# Patient Record
Sex: MALE
[De-identification: names, ages, dates, MRNs, and addresses within clinical notes are randomized per-mention and may not be internally consistent; named-entity substitution may affect disease eponyms.]

---

## 2022-03-09 ENCOUNTER — APPOINTMENT (OUTPATIENT)
Dept: UROLOGY | Facility: CLINIC | Age: 47
End: 2022-03-09
Payer: COMMERCIAL

## 2022-03-09 DIAGNOSIS — Z78.9 OTHER SPECIFIED HEALTH STATUS: ICD-10-CM

## 2022-03-09 PROBLEM — Z00.00 ENCOUNTER FOR PREVENTIVE HEALTH EXAMINATION: Status: ACTIVE | Noted: 2022-03-09

## 2022-03-09 PROCEDURE — 99204 OFFICE O/P NEW MOD 45 MIN: CPT

## 2022-03-09 RX ORDER — ALLOPURINOL 200 MG/1
TABLET ORAL
Refills: 0 | Status: ACTIVE | COMMUNITY

## 2022-03-09 RX ORDER — CLOPIDOGREL BISULFATE 300 MG/1
TABLET, FILM COATED ORAL
Refills: 0 | Status: ACTIVE | COMMUNITY

## 2022-03-09 RX ORDER — SITAGLIPTIN AND METFORMIN HYDROCHLORIDE 50; 1000 MG/1; MG/1
50-1000 TABLET, FILM COATED ORAL
Refills: 0 | Status: ACTIVE | COMMUNITY

## 2022-03-09 RX ORDER — POTASSIUM CITRATE 15 MEQ/1
TABLET, EXTENDED RELEASE ORAL
Refills: 0 | Status: ACTIVE | COMMUNITY

## 2022-03-09 RX ORDER — LOSARTAN POTASSIUM 100 MG/1
TABLET, FILM COATED ORAL
Refills: 0 | Status: ACTIVE | COMMUNITY

## 2022-03-09 RX ORDER — PANTOPRAZOLE 40 MG/1
TABLET, DELAYED RELEASE ORAL
Refills: 0 | Status: ACTIVE | COMMUNITY

## 2022-03-09 RX ORDER — METOPROLOL TARTRATE 50 MG/1
50 TABLET, FILM COATED ORAL
Refills: 0 | Status: ACTIVE | COMMUNITY

## 2022-03-09 RX ORDER — COLCHICINE 0.6 MG/1
TABLET ORAL
Refills: 0 | Status: ACTIVE | COMMUNITY

## 2022-03-09 RX ORDER — GEMFIBROZIL 600 MG/1
TABLET, FILM COATED ORAL
Refills: 0 | Status: ACTIVE | COMMUNITY

## 2022-03-09 RX ORDER — GLIPIZIDE 5 MG/1
5 TABLET ORAL
Refills: 0 | Status: ACTIVE | COMMUNITY

## 2022-03-09 RX ORDER — NIFEDIPINE 20 MG/1
CAPSULE ORAL
Refills: 0 | Status: ACTIVE | COMMUNITY

## 2022-03-09 RX ORDER — ROSUVASTATIN CALCIUM 5 MG/1
TABLET, FILM COATED ORAL
Refills: 0 | Status: ACTIVE | COMMUNITY

## 2022-03-09 RX ORDER — EZETIMIBE 10 MG/1
10 TABLET ORAL
Refills: 0 | Status: ACTIVE | COMMUNITY

## 2022-03-09 NOTE — REVIEW OF SYSTEMS
[Eyesight Problems] : eyesight problems [Joint Swelling] : joint swelling [Itching] : itching [Negative] : Heme/Lymph

## 2022-03-14 NOTE — HISTORY OF PRESENT ILLNESS
[None] : no symptoms [FreeTextEntry1] : ELISABET PERES is a 46 year M who presents for ? incidental right upper pole stone 6 mm suspected based on renal US report done for CKD. No stone pain, no flank or abdomen pain, no hematuria. No dysuria. H/o gout, in addition to mult underlying disease. \par \par PMH: cad, cholesterol, htn, ckd, h/o gout\par PSH: no\par FH: no sig  FH\par Meds: mult- see list\par SH: former smoker\par Allergies: nkda\par

## 2022-03-14 NOTE — PHYSICAL EXAM
[General Appearance - Well Developed] : well developed [Edema] : no peripheral edema [Abdomen Soft] : soft [Normal Station and Gait] : the gait and station were normal for the patient's age [] : no rash [No Focal Deficits] : no focal deficits [Oriented To Time, Place, And Person] : oriented to person, place, and time [Urinary Bladder Findings] : the bladder was normal on palpation

## 2022-03-14 NOTE — ASSESSMENT
[FreeTextEntry1] : Report from Renal US-  right 6 mm kidney stone. Bilateral simple cysts. Bilateral no hydro or solid masses\par \par Pt is scheduled for a kidney biopsy for kidney function \par \par Ordered CT stone to evaluate stone burden \par \par plan \par 1) CT stone hunt \par 2) RTC to review (or telehealth)

## 2022-03-17 ENCOUNTER — RESULT REVIEW (OUTPATIENT)
Age: 47
End: 2022-03-17

## 2022-03-17 ENCOUNTER — APPOINTMENT (OUTPATIENT)
Dept: CT IMAGING | Facility: IMAGING CENTER | Age: 47
End: 2022-03-17
Payer: COMMERCIAL

## 2022-03-17 ENCOUNTER — OUTPATIENT (OUTPATIENT)
Dept: OUTPATIENT SERVICES | Facility: HOSPITAL | Age: 47
LOS: 1 days | End: 2022-03-17
Payer: COMMERCIAL

## 2022-03-17 DIAGNOSIS — N20.0 CALCULUS OF KIDNEY: ICD-10-CM

## 2022-03-17 PROCEDURE — 74176 CT ABD & PELVIS W/O CONTRAST: CPT | Mod: 26

## 2022-03-17 PROCEDURE — 74176 CT ABD & PELVIS W/O CONTRAST: CPT

## 2022-03-22 ENCOUNTER — APPOINTMENT (OUTPATIENT)
Dept: UROLOGY | Facility: CLINIC | Age: 47
End: 2022-03-22
Payer: COMMERCIAL

## 2022-03-22 DIAGNOSIS — E27.8 OTHER SPECIFIED DISORDERS OF ADRENAL GLAND: ICD-10-CM

## 2022-03-22 PROCEDURE — 99214 OFFICE O/P EST MOD 30 MIN: CPT | Mod: 95

## 2022-03-22 NOTE — HISTORY OF PRESENT ILLNESS
[Other Location: e.g. School (Enter Location, City,State)___] : at [unfilled], at the time of the visit. [Other Location: e.g. Home (Enter Location, City,State)___] : at [unfilled] [Verbal consent obtained from patient] : the patient, [unfilled] [FreeTextEntry1] : The patient-doctor relationship has been established in a face to face fashion via real time video/audio HIPAA compliant communication using telemedicine software. The patient's identity has been confirmed. The patient was previously emailed a copy of the telemedicine consent. They have had a chance to review and has now given verbal consent and has requested care to be assessed and treated via telemedicine. They understand there may be limitations in this process, and that they may need further followup care in the office and/or hospital settings.\par \par Verbal consent given on Mar 22 2022 11:00AM\par \par ELISABET PERES is a 47 year M who presents for f/u of stone disease suggested on renal US.\par \par CT scan: no current stones. Small likely cyst right upper. incidental 1.9 cm left adrenal nodule, low HU, c/w most with incidental adenoma.\par \par 24 hour urine with his nephrologist with 4 liters uop, but UA 1384 mg, oxalate 66, and sodium 276 (I don't see calcium).\par \par Serum labs with creat 1.9, K 3.6, calcium 9.8\par \par 03/22/2022 \par \par The patient denies fevers, chills, nausea and/or vomiting, and no unexplained weight loss.\par \par All pertinent parts of the patient PFSH (past medical, family, and social histories), laboratory, radiological studies and available physician notes were reviewed prior to starting the face-to-face portion of the telemedicine visit. Questionnaire results, where appropriate, were discussed with the patient.\par

## 2022-03-22 NOTE — ASSESSMENT
[FreeTextEntry1] : Had long d/w pt regarding films, w/u- stone disease risks, incidental adrenal adenoma.\par \par Based on guidelines, adrenal needs endocrine eval, but size and appearance are o/w low risk at this time.\par \par This includes increasing fluid intake to produce 2 to 2.5 liters of urine per day (approx 3 liters intake), and should be primarily water.  \par \par Calcium intake should be approximately 1000 mg per day.  \par \par Oxalate intake should be reduced- most common sources in diet which have very high levels include peanuts/nuts, tea, coffee, chocolate, spinach, beets, rhubarb, swiss chard.  I've also given/directed to a list with other high oxalate.\par   \par Animal flesh protein should be controlled- approx 4 to 6 ounces per day, with some vegetarian days included in the week.  Studies have shown that vegetarians have half the risk of stones of people who eat only 4 ounces per day of meat or fish of any kind (beef, chicken, fish, shellfish, pork, etc), indicating that a vegetarian lifestyle can decrease future stone risks.\par \par Finally, salt intake should be reduced as high levels in the diet will increase urinary calcium.  <2400 mg per day on a low sodium diet is strongly recommended.\par \par Citrate is a benefit; ben and limes with most citrate and least sugar- recommend 'a lemon or lime a day'; easiest with concentrate, mixed into water or other beverages.\par \par Lifestyle changes: regular exercise and weight loss both are independent risk-reducers for stones.\par \par In addition, for further details online, go to www.HelpHub.com <http://www.HelpHub.com> ---> in the lower left column there is a link for "diet resources", and review or download.\par \par \par 1. urine/plasma metanephrines, aldosterone:renin ratio\par 2. diet mod\par 3. 24 hour urine collection for stone risk in approx 4 weeks\par 4. repeat telehealth to review in approx 6 weeks- may need meds/supplements\par 5. pt requesting nephrologist in [facility]. Bulter

## 2022-03-29 ENCOUNTER — LABORATORY RESULT (OUTPATIENT)
Age: 47
End: 2022-03-29

## 2022-03-29 ENCOUNTER — APPOINTMENT (OUTPATIENT)
Dept: NEPHROLOGY | Facility: CLINIC | Age: 47
End: 2022-03-29
Payer: COMMERCIAL

## 2022-03-29 VITALS
DIASTOLIC BLOOD PRESSURE: 82 MMHG | HEIGHT: 67 IN | SYSTOLIC BLOOD PRESSURE: 135 MMHG | BODY MASS INDEX: 39.24 KG/M2 | RESPIRATION RATE: 18 BRPM | WEIGHT: 250 LBS | HEART RATE: 64 BPM

## 2022-03-29 PROCEDURE — 99205 OFFICE O/P NEW HI 60 MIN: CPT

## 2022-04-08 LAB
ALDOSTERONE SERUM: 19.3 NG/DL
RENIN PLASMA: 35.5 PG/ML

## 2022-04-11 LAB
25(OH)D3 SERPL-MCNC: 25 NG/ML
ALBUMIN MFR SERPL ELPH: 56.5 %
ALBUMIN SERPL ELPH-MCNC: 4.7 G/DL
ALBUMIN SERPL-MCNC: 4.4 G/DL
ALBUMIN/GLOB SERPL: 1.3 RATIO
ALP BLD-CCNC: 85 U/L
ALPHA1 GLOB MFR SERPL ELPH: 3.9 %
ALPHA1 GLOB SERPL ELPH-MCNC: 0.3 G/DL
ALPHA2 GLOB MFR SERPL ELPH: 12.9 %
ALPHA2 GLOB SERPL ELPH-MCNC: 1 G/DL
ALT SERPL-CCNC: 21 U/L
ANA SER IF-ACNC: NEGATIVE
ANION GAP SERPL CALC-SCNC: 15 MMOL/L
APPEARANCE: CLEAR
AST SERPL-CCNC: 19 U/L
B-GLOBULIN MFR SERPL ELPH: 11.2 %
B-GLOBULIN SERPL ELPH-MCNC: 0.9 G/DL
BACTERIA: NEGATIVE
BASOPHILS # BLD AUTO: 0.04 K/UL
BASOPHILS NFR BLD AUTO: 0.4 %
BILIRUB SERPL-MCNC: 0.3 MG/DL
BILIRUBIN URINE: NEGATIVE
BLOOD URINE: NEGATIVE
BUN SERPL-MCNC: 22 MG/DL
C3 SERPL-MCNC: 177 MG/DL
C4 SERPL-MCNC: 41 MG/DL
CALCIUM SERPL-MCNC: 9.9 MG/DL
CALCIUM SERPL-MCNC: 9.9 MG/DL
CHLORIDE SERPL-SCNC: 103 MMOL/L
CO2 SERPL-SCNC: 24 MMOL/L
COLOR: NORMAL
CREAT SERPL-MCNC: 1.97 MG/DL
CREAT SPEC-SCNC: 80 MG/DL
CREAT/PROT UR: 3 RATIO
DEPRECATED KAPPA LC FREE/LAMBDA SER: 1.6 RATIO
DEPRECATED KAPPA LC FREE/LAMBDA SER: 1.6 RATIO
EGFR: 41 ML/MIN/1.73M2
EOSINOPHIL # BLD AUTO: 0.17 K/UL
EOSINOPHIL NFR BLD AUTO: 1.8 %
ERYTHROCYTE [SEDIMENTATION RATE] IN BLOOD BY WESTERGREN METHOD: 27 MM/HR
FREE KAPPA URINE: 178.49 MG/L
FREE KAPPA/LAMDA RATIO: 4.5 RATIO
FREE LAMDA URINE: 39.68 MG/L
GAMMA GLOB FLD ELPH-MCNC: 1.2 G/DL
GAMMA GLOB MFR SERPL ELPH: 15.5 %
GLUCOSE QUALITATIVE U: NEGATIVE
GLUCOSE SERPL-MCNC: 89 MG/DL
HAV IGM SER QL: NONREACTIVE
HBV CORE IGM SER QL: NONREACTIVE
HBV SURFACE AG SER QL: NONREACTIVE
HCT VFR BLD CALC: 49.2 %
HCV AB SER QL: NONREACTIVE
HCV S/CO RATIO: 0.26 S/CO
HGB A MFR BLD: 97.5 %
HGB A2 MFR BLD: 2.5 %
HGB BLD-MCNC: 15 G/DL
HGB FRACT BLD-IMP: NORMAL
HIV1+2 AB SPEC QL IA.RAPID: NONREACTIVE
HYALINE CASTS: 0 /LPF
IGA SER QL IEP: 65 MG/DL
IGG SER QL IEP: 1277 MG/DL
IGM SER QL IEP: 51 MG/DL
IMM GRANULOCYTES NFR BLD AUTO: 0.3 %
INTERPRETATION SERPL IEP-IMP: NORMAL
KAPPA LC 24H UR QL: NORMAL
KAPPA LC CSF-MCNC: 2.39 MG/DL
KAPPA LC CSF-MCNC: 2.39 MG/DL
KAPPA LC SERPL-MCNC: 3.82 MG/DL
KAPPA LC SERPL-MCNC: 3.82 MG/DL
KETONES URINE: NEGATIVE
LEUKOCYTE ESTERASE URINE: ABNORMAL
LYMPHOCYTES # BLD AUTO: 2.12 K/UL
LYMPHOCYTES NFR BLD AUTO: 22.4 %
M PROTEIN SPEC IFE-MCNC: NORMAL
MAN DIFF?: NORMAL
MCHC RBC-ENTMCNC: 27.4 PG
MCHC RBC-ENTMCNC: 30.5 GM/DL
MCV RBC AUTO: 89.9 FL
MICROSCOPIC-UA: NORMAL
MONOCYTES # BLD AUTO: 0.77 K/UL
MONOCYTES NFR BLD AUTO: 8.1 %
NEUTROPHILS # BLD AUTO: 6.34 K/UL
NEUTROPHILS NFR BLD AUTO: 67 %
NITRITE URINE: NEGATIVE
PARATHYROID HORMONE INTACT: 48 PG/ML
PH URINE: 6.5
PHOSPHOLIPASE A2 RECEPTOR ELISA: <1.8 RU/ML
PLATELET # BLD AUTO: 314 K/UL
POTASSIUM SERPL-SCNC: 4.3 MMOL/L
PROT SERPL-MCNC: 7.7 G/DL
PROT UR-MCNC: 238 MG/DL
PROTEIN URINE: ABNORMAL
PROTEINASE3 AB SER IA-ACNC: <5 UNITS
PROTEINASE3 AB SER-ACNC: NEGATIVE
RBC # BLD: 5.47 M/UL
RBC # FLD: 13.9 %
RED BLOOD CELLS URINE: 1 /HPF
SODIUM SERPL-SCNC: 142 MMOL/L
SPECIFIC GRAVITY URINE: 1.01
SQUAMOUS EPITHELIAL CELLS: 1 /HPF
URATE SERPL-MCNC: 4.8 MG/DL
UROBILINOGEN URINE: NORMAL
WBC # FLD AUTO: 9.47 K/UL
WHITE BLOOD CELLS URINE: 6 /HPF

## 2022-04-14 ENCOUNTER — TRANSCRIPTION ENCOUNTER (OUTPATIENT)
Age: 47
End: 2022-04-14

## 2022-04-15 LAB
METANEPH 24H UR-MRATE: 156 UG/24 HR
METANEPHRINE, PL: 23.9 PG/ML
METANEPHS 24H UR-MCNC: 75 UG/L
NORMETANEPHRINE 24 HR URINE: 639 UG/24 HR
NORMETANEPHRINE 24H UR-MCNC: 308 UG/L
NORMETANEPHRINE, PL: 97.2 PG/ML

## 2022-04-23 LAB
STONE PLEASE NOTE:: NORMAL
STONE REVIEW: NORMAL
STONE, AMMONIUM 24 HR URINE: 13
STONE, AMMONIUM URINE: NORMAL UG/DL
STONE, BRUSHITE: 0.05 RATIO
STONE, CALCIUM 24 HR URINE: 36.8 MG/24 HR
STONE, CALCIUM OXALATE: 1.3 RATIO
STONE, CALCIUM URINE: 2.1 MG/DL
STONE, CHLORIDE 24 HR URINE: 117
STONE, CHLORIDE: 67 MMOL/L
STONE, CITRATE 24 HR URINE: 252 MG/24 HR
STONE, CITRATE URINE: 144 MG/L
STONE, CREATININE 24 HR URINE: 1389.5 MG/24 HR
STONE, CREATININE URINE: 79.4 MG/DL
STONE, CYSTINE 24 HR URINE: 13 MG/24 HR
STONE, CYSTINE URINE: 7.43 MG/L
STONE, MAGNESIUM 24 HR URINE: 65 MG/24 HR
STONE, MAGNESIUM URINE: 3.7 MG/DL
STONE, OSMOLALITY: 458
STONE, OXALATE 24 HR URINE: 26 MG/24 HR
STONE, OXALATE URINE: 15 MG/L
STONE, PH URINE: 5.3
STONE, PHOSPHOROUS URINE: 36.6 MG/DL
STONE, PHOSPHORUS 24 HR URINE: 640.5 MG/24 HR
STONE, POTASSIUM 24 HR URINE: 61.6
STONE, POTASSIUM URINE: 35.2 MMOL/L
STONE, SODIUM 24 HR URINE: 128
STONE, SODIUM URATE: 1.25 RATIO
STONE, SODIUM URINE: 73 MMOL/L
STONE, STRUVITE: 0.01 RATIO
STONE, SULFATE 24 HR URINE: 30
STONE, SULFATE URINE: 17 MEQ/L
STONE, URIC ACID 24 HR URINE: 497 MG/24 HR
STONE, URIC ACID URINE: 28.4 MG/DL
STONE, URIC ACID URINE: 3.4 RATIO
TOT VOL (PRESERVATIVE): 1750 ML/24 HR
TOTAL VOLUME URINE: 1750 ML/24 HR

## 2022-05-03 ENCOUNTER — APPOINTMENT (OUTPATIENT)
Dept: UROLOGY | Facility: CLINIC | Age: 47
End: 2022-05-03
Payer: COMMERCIAL

## 2022-05-03 DIAGNOSIS — E66.9 TYPE 2 DIABETES MELLITUS WITH OTHER SPECIFIED COMPLICATION: ICD-10-CM

## 2022-05-03 DIAGNOSIS — R82.992 HYPEROXALURIA: ICD-10-CM

## 2022-05-03 DIAGNOSIS — R82.993 HYPERURICOSURIA: ICD-10-CM

## 2022-05-03 DIAGNOSIS — N20.0 CALCULUS OF KIDNEY: ICD-10-CM

## 2022-05-03 DIAGNOSIS — Z86.39 PERSONAL HISTORY OF OTHER ENDOCRINE, NUTRITIONAL AND METABOLIC DISEASE: ICD-10-CM

## 2022-05-03 DIAGNOSIS — Z87.39 PERSONAL HISTORY OF OTHER DISEASES OF THE MUSCULOSKELETAL SYSTEM AND CONNECTIVE TISSUE: ICD-10-CM

## 2022-05-03 DIAGNOSIS — E11.69 TYPE 2 DIABETES MELLITUS WITH OTHER SPECIFIED COMPLICATION: ICD-10-CM

## 2022-05-03 PROCEDURE — 99442: CPT

## 2022-05-03 NOTE — HISTORY OF PRESENT ILLNESS
[Other Location: e.g. School (Enter Location, City,State)___] : at [unfilled], at the time of the visit. [Other Location: e.g. Home (Enter Location, City,State)___] : at [unfilled] [Verbal consent obtained from patient] : the patient, [unfilled] [FreeTextEntry1] : The patient-doctor relationship has been established in a face to face fashion via real time video/audio HIPAA compliant communication using telemedicine software. The patient's identity has been confirmed. The patient was previously emailed a copy of the telemedicine consent. They have had a chance to review and has now given verbal consent and has requested care to be assessed and treated via telemedicine. They understand there may be limitations in this process, and that they may need further followup care in the office and/or hospital settings.\par \par Verbal consent given on May  3 2022  9:00AM\par \par ELISABET PERES is a 47 year M who presents for f/u 24 hour urine- previously with UA > 1300 mg, oxalate 66 mg, low citrate. H/o gout, CKD3. On pot citrate bid with good potassium levels despite gfr 41, and on allopurinol.\par \par 24 hour urine repeat:\par vol 1750 c, calcium 37, sodium 128, oxalate 26, , ph 5.3, citrate 252.\par Overall with dramatic improvements, though pH remains very low amplifying UA risk, and citrate low despite pot citrate, likely due to overall metabolic acidosis.\par \par 05/03/2022 \par \par The patient denies fevers, chills, nausea and/or vomiting, and no unexplained weight loss.\par \par All pertinent parts of the patient PFSH (past medical, family, and social histories), laboratory, radiological studies and available physician notes were reviewed prior to starting the face-to-face portion of the telemedicine visit. Questionnaire results, where appropriate, were discussed with the patient.\par \par

## 2022-05-03 NOTE — ASSESSMENT
[FreeTextEntry1] : Had long d/w pt about findings, management. WOuld not increase pot citrate given CKD\par sodium bicarb to alkalinize likely best option; \par \par For additional alkalinization of the urine, can also try 1/4 teaspoon baking soda in water or juice po bid for alkalinization-- studies have show sig alkalinization with low risks, though minimal concerns about the addition of sodium also reviewed with pt.\par \par Pt comfortable with trying this\par \par no stone on recent CT\par \par RTC 6 months with renal US

## 2022-10-18 ENCOUNTER — RX RENEWAL (OUTPATIENT)
Age: 47
End: 2022-10-18

## 2022-11-23 ENCOUNTER — APPOINTMENT (OUTPATIENT)
Dept: UROLOGY | Facility: CLINIC | Age: 47
End: 2022-11-23

## 2023-02-07 ENCOUNTER — NON-APPOINTMENT (OUTPATIENT)
Age: 48
End: 2023-02-07

## 2023-02-08 ENCOUNTER — APPOINTMENT (OUTPATIENT)
Dept: NEPHROLOGY | Facility: CLINIC | Age: 48
End: 2023-02-08
Payer: COMMERCIAL

## 2023-02-08 VITALS — HEART RATE: 92 BPM | RESPIRATION RATE: 16 BRPM | SYSTOLIC BLOOD PRESSURE: 181 MMHG | DIASTOLIC BLOOD PRESSURE: 107 MMHG

## 2023-02-08 DIAGNOSIS — R82.991 HYPOCITRATURIA: ICD-10-CM

## 2023-02-08 PROCEDURE — 99215 OFFICE O/P EST HI 40 MIN: CPT | Mod: 95

## 2023-02-08 RX ORDER — DAPAGLIFLOZIN 10 MG/1
10 TABLET, FILM COATED ORAL DAILY
Qty: 90 | Refills: 3 | Status: ACTIVE | COMMUNITY
Start: 2022-04-11 | End: 1900-01-01

## 2023-02-08 RX ORDER — MULTIVIT-MIN/IRON/FOLIC ACID/K 18-600-40
50 MCG CAPSULE ORAL
Qty: 90 | Refills: 3 | Status: ACTIVE | COMMUNITY
Start: 2022-04-11 | End: 1900-01-01

## 2023-02-08 NOTE — PHYSICAL EXAM
[General Appearance - Alert] : alert [General Appearance - In No Acute Distress] : in no acute distress [Sclera] : the sclera and conjunctiva were normal [PERRL With Normal Accommodation] : pupils were equal in size, round, and reactive to light [Extraocular Movements] : extraocular movements were intact [Outer Ear] : the ears and nose were normal in appearance [Oropharynx] : the oropharynx was normal [Neck Appearance] : the appearance of the neck was normal [Neck Cervical Mass (___cm)] : no neck mass was observed [Jugular Venous Distention Increased] : there was no jugular-venous distention [Thyroid Diffuse Enlargement] : the thyroid was not enlarged [Thyroid Nodule] : there were no palpable thyroid nodules [Exaggerated Use Of Accessory Muscles For Inspiration] : no accessory muscle use [Abnormal Walk] : normal gait [Nail Clubbing] : no clubbing  or cyanosis of the fingernails [Motor Tone] : muscle strength and tone were normal [Skin Color & Pigmentation] : normal skin color and pigmentation [Skin Turgor] : normal skin turgor [] : no rash [No Focal Deficits] : no focal deficits [Oriented To Time, Place, And Person] : oriented to person, place, and time [Impaired Insight] : insight and judgment were intact [Affect] : the affect was normal

## 2023-02-08 NOTE — REASON FOR VISIT
[Home] : at home, [unfilled] , at the time of the visit. [Medical Office: ([facility])___] : at the medical office located in  [Patient] : the patient [Self] : self [Follow-Up] : a follow-up visit

## 2023-04-18 ENCOUNTER — APPOINTMENT (OUTPATIENT)
Dept: NEPHROLOGY | Facility: CLINIC | Age: 48
End: 2023-04-18
Payer: COMMERCIAL

## 2023-04-18 VITALS
HEART RATE: 76 BPM | WEIGHT: 273 LBS | SYSTOLIC BLOOD PRESSURE: 144 MMHG | HEIGHT: 67 IN | OXYGEN SATURATION: 97 % | TEMPERATURE: 97.5 F | BODY MASS INDEX: 42.85 KG/M2 | DIASTOLIC BLOOD PRESSURE: 92 MMHG

## 2023-04-18 DIAGNOSIS — R80.9 PROTEINURIA, UNSPECIFIED: ICD-10-CM

## 2023-04-18 DIAGNOSIS — I10 ESSENTIAL (PRIMARY) HYPERTENSION: ICD-10-CM

## 2023-04-18 DIAGNOSIS — N18.32 CHRONIC KIDNEY DISEASE, STAGE 3B: ICD-10-CM

## 2023-04-18 PROCEDURE — 99215 OFFICE O/P EST HI 40 MIN: CPT

## 2023-04-18 RX ORDER — SITAGLIPTIN 25 MG/1
25 TABLET, FILM COATED ORAL DAILY
Qty: 90 | Refills: 3 | Status: ACTIVE | COMMUNITY
Start: 2023-04-18 | End: 1900-01-01

## 2023-05-03 LAB
25(OH)D3 SERPL-MCNC: 37.5 NG/ML
ALBUMIN SERPL ELPH-MCNC: 4.6 G/DL
ANION GAP SERPL CALC-SCNC: 16 MMOL/L
APPEARANCE: CLEAR
BACTERIA: NEGATIVE
BASOPHILS # BLD AUTO: 0.05 K/UL
BASOPHILS NFR BLD AUTO: 0.5 %
BILIRUBIN URINE: NEGATIVE
BLOOD URINE: ABNORMAL
BUN SERPL-MCNC: 26 MG/DL
CALCIUM SERPL-MCNC: 10.5 MG/DL
CALCIUM SERPL-MCNC: 10.5 MG/DL
CHLORIDE SERPL-SCNC: 104 MMOL/L
CO2 SERPL-SCNC: 23 MMOL/L
COLOR: NORMAL
CREAT SERPL-MCNC: 2.1 MG/DL
CREAT SPEC-SCNC: 54 MG/DL
CREAT/PROT UR: 4.3 RATIO
EGFR: 38 ML/MIN/1.73M2
EOSINOPHIL # BLD AUTO: 0.22 K/UL
EOSINOPHIL NFR BLD AUTO: 2.2 %
GLUCOSE QUALITATIVE U: ABNORMAL
GLUCOSE SERPL-MCNC: 74 MG/DL
HCT VFR BLD CALC: 47.1 %
HGB BLD-MCNC: 14.9 G/DL
HYALINE CASTS: 1 /LPF
IMM GRANULOCYTES NFR BLD AUTO: 0.4 %
KETONES URINE: NEGATIVE
LEUKOCYTE ESTERASE URINE: NEGATIVE
LYMPHOCYTES # BLD AUTO: 2.03 K/UL
LYMPHOCYTES NFR BLD AUTO: 19.9 %
MAN DIFF?: NORMAL
MCHC RBC-ENTMCNC: 28 PG
MCHC RBC-ENTMCNC: 31.6 GM/DL
MCV RBC AUTO: 88.5 FL
MICROSCOPIC-UA: NORMAL
MONOCYTES # BLD AUTO: 1.07 K/UL
MONOCYTES NFR BLD AUTO: 10.5 %
NEUTROPHILS # BLD AUTO: 6.79 K/UL
NEUTROPHILS NFR BLD AUTO: 66.5 %
NITRITE URINE: NEGATIVE
PARATHYROID HORMONE INTACT: 18 PG/ML
PH URINE: 6
PHOSPHATE SERPL-MCNC: 3.3 MG/DL
PLATELET # BLD AUTO: 269 K/UL
POTASSIUM SERPL-SCNC: 4 MMOL/L
PROT UR-MCNC: 235 MG/DL
PROTEIN URINE: ABNORMAL
RBC # BLD: 5.32 M/UL
RBC # FLD: 14.5 %
RED BLOOD CELLS URINE: 0 /HPF
SODIUM SERPL-SCNC: 142 MMOL/L
SPECIFIC GRAVITY URINE: 1.01
SQUAMOUS EPITHELIAL CELLS: 1 /HPF
UROBILINOGEN URINE: NORMAL
WBC # FLD AUTO: 10.2 K/UL
WHITE BLOOD CELLS URINE: 1 /HPF